# Patient Record
Sex: MALE | Race: WHITE | NOT HISPANIC OR LATINO | Employment: OTHER | URBAN - METROPOLITAN AREA
[De-identification: names, ages, dates, MRNs, and addresses within clinical notes are randomized per-mention and may not be internally consistent; named-entity substitution may affect disease eponyms.]

---

## 2017-01-20 ENCOUNTER — TELEPHONE (OUTPATIENT)
Dept: FAMILY MEDICINE | Facility: CLINIC | Age: 62
End: 2017-01-20

## 2017-01-20 NOTE — TELEPHONE ENCOUNTER
Pt's wife Janel called looking for an eye clinic.  Gave them [facility] and [facility] clinic with both numbers.   Kenia.SHRUTHI Worley ([facility])

## 2017-02-22 ENCOUNTER — OFFICE VISIT (OUTPATIENT)
Dept: FAMILY MEDICINE | Facility: CLINIC | Age: 62
End: 2017-02-22

## 2017-02-22 VITALS
BODY MASS INDEX: 31.4 KG/M2 | OXYGEN SATURATION: 97 % | DIASTOLIC BLOOD PRESSURE: 70 MMHG | WEIGHT: 207.2 LBS | SYSTOLIC BLOOD PRESSURE: 124 MMHG | HEART RATE: 71 BPM | HEIGHT: 68 IN | TEMPERATURE: 98.5 F

## 2017-02-22 DIAGNOSIS — Z11.59 NEED FOR HEPATITIS C SCREENING TEST: ICD-10-CM

## 2017-02-22 DIAGNOSIS — Z00.00 ROUTINE GENERAL MEDICAL EXAMINATION AT A HEALTH CARE FACILITY: Primary | ICD-10-CM

## 2017-02-22 DIAGNOSIS — Z12.5 SCREENING FOR MALIGNANT NEOPLASM OF PROSTATE: ICD-10-CM

## 2017-02-22 PROCEDURE — 80061 LIPID PANEL: CPT | Mod: 90 | Performed by: FAMILY MEDICINE

## 2017-02-22 PROCEDURE — 84153 ASSAY OF PSA TOTAL: CPT | Mod: 90 | Performed by: FAMILY MEDICINE

## 2017-02-22 PROCEDURE — 36415 COLL VENOUS BLD VENIPUNCTURE: CPT | Performed by: FAMILY MEDICINE

## 2017-02-22 PROCEDURE — 99396 PREV VISIT EST AGE 40-64: CPT | Performed by: FAMILY MEDICINE

## 2017-02-22 PROCEDURE — 86803 HEPATITIS C AB TEST: CPT | Mod: 90 | Performed by: FAMILY MEDICINE

## 2017-02-22 PROCEDURE — 80053 COMPREHEN METABOLIC PANEL: CPT | Mod: 90 | Performed by: FAMILY MEDICINE

## 2017-02-22 NOTE — PROGRESS NOTES
SUBJECTIVE:     CC: Katia Soler is an 61 year old male who presents for preventative health visit.     Healthy Habits:    Do you get at least three servings of calcium containing foods daily (dairy, green leafy vegetables, etc.)? yes    Amount of exercise or daily activities, outside of work: 2 day(s) per week    Problems taking medications regularly not applicable    Medication side effects: No    Have you had an eye exam in the past two years? yes    Do you see a dentist twice per year? yes    Do you have sleep apnea, excessive snoring or daytime drowsiness?no            Today's PHQ-2 Score:   PHQ-2 ( 1999 [employer]) 2/22/2017 3/1/2016   Q1: Little interest or pleasure in doing things 0 0   Q2: Feeling down, depressed or hopeless 0 0   PHQ-2 Score 0 0       Abuse: Current or Past(Physical, Sexual or Emotional)- No  Do you feel safe in your environment - Yes    Social History   Substance Use Topics     Smoking status: Never Smoker     Smokeless tobacco: Never Used     Alcohol use 1.8 oz/week     3 Standard drinks or equivalent per week     The patient does not drink >3 drinks per day nor >7 drinks per week.    Last PSA: No results found for: PSA    Recent Labs   Lab Test  03/01/16   1034  03/20/15   0945   CHOL  286*  287*   HDL  48  58   LDL  209*  202*   TRIG  147  133   CHOLHDLRATIO  6.0*  4.9       Reviewed orders with patient. Reviewed health maintenance and updated orders accordingly - Yes    All Histories reviewed and updated in Epic.  Past Medical History   Diagnosis Date     NO ACTIVE PROBLEMS       Past Surgical History   Procedure Laterality Date     No history of surgery         ROS:  C: NEGATIVE for fever, chills, change in weight  I: NEGATIVE for worrisome rashes, moles or lesions  E: NEGATIVE for vision changes or irritation  ENT: NEGATIVE for ear, mouth and throat problems  R: NEGATIVE for significant cough or SOB  CV: NEGATIVE for chest pain, palpitations or peripheral edema  GI: NEGATIVE for  nausea, abdominal pain, heartburn, or change in bowel habits   male: negative for dysuria, hematuria, decreased urinary stream, erectile dysfunction, urethral discharge  M: NEGATIVE for significant arthralgias or myalgia  N: NEGATIVE for weakness, dizziness or paresthesias  P: NEGATIVE for changes in mood or affect    Problem list, Medication list, Allergies, and Medical/Social/Surgical histories reviewed in [facility] and updated as appropriate.  Labs reviewed in EPIC  BP Readings from Last 3 Encounters:   02/22/17 124/70   03/01/16 120/78   03/20/15 128/80    Wt Readings from Last 3 Encounters:   02/22/17 94 kg (207 lb 3.2 oz)   03/01/16 91.6 kg (202 lb)   03/20/15 92.6 kg (204 lb 2 oz)                  Patient Active Problem List   Diagnosis     ACP (advance care planning)     Health Care Home     Pure hypercholesterolemia     Past Surgical History   Procedure Laterality Date     No history of surgery         Social History   Substance Use Topics     Smoking status: Never Smoker     Smokeless tobacco: Never Used     Alcohol use 1.8 oz/week     3 Standard drinks or equivalent per week     Family History   Problem Relation Age of Onset     Hyperlipidemia Mother      DIABETES Father      in his 40s. He is 97 years old now with good memory.     Coronary Artery Disease Father      MI in his 60s     Hyperlipidemia Father      CEREBROVASCULAR DISEASE Father      Hypertension No family hx of      Breast Cancer No family hx of      Cancer - colorectal No family hx of      Ovarian Cancer No family hx of      Prostate Cancer No family hx of      Depression/Anxiety No family hx of      Anesthesia Reaction No family hx of      Thyroid Disease No family hx of      Asthma No family hx of      Chemical Addiction No family hx of          No current outpatient prescriptions on file.     No Known Allergies  Recent Labs   Lab Test  03/01/16   1034  03/20/15   0945   LDL  209*  202*   HDL  48  58   TRIG  147  133   ALT  22  21   CR  1.15  " 1.10   GFRESTIMATED  68  72   POTASSIUM  5.3  4.1      OBJECTIVE:     /70 (BP Location: Right arm, Patient Position: Chair, Cuff Size: Adult Large)  Pulse 71  Temp 98.5  F (36.9  C) (Oral)  Ht 1.727 m (5' 8\")  Wt 94 kg (207 lb 3.2 oz)  SpO2 97%  BMI 31.5 kg/m2  EXAM:  GENERAL: healthy, alert and no distress  EYES: Eyes grossly normal to inspection, PERRL and conjunctivae and sclerae normal  HENT: ear canals and TM's normal, nose and mouth without ulcers or lesions  NECK: no adenopathy, no asymmetry, masses, or scars and thyroid normal to palpation  RESP: lungs clear to auscultation - no rales, rhonchi or wheezes  CV: regular rate and rhythm, normal S1 S2, no S3 or S4, no murmur, click or rub, no peripheral edema and peripheral pulses strong  ABDOMEN: soft, nontender, no hepatosplenomegaly, no masses and bowel sounds normal   (male): deferred  RECTAL (male): deferred  MS: no gross musculoskeletal defects noted, no edema  SKIN: no suspicious lesions or rashes  NEURO: Normal strength and tone, mentation intact and speech normal  PSYCH: mentation appears normal, affect normal/bright    ASSESSMENT/PLAN:     (Z00.00) Routine general medical examination at a health care facility  (primary encounter diagnosis)  Comment: discussed preventitive healthcare   Plan: Lipid Profile ([facility]), COMPREHENSIVE METABOLIC         PANEL ([facility]) XCMP, Hepatits C antibody         ([facility]), VENOUS COLLECTION, HCL PSA, SCREENING         ([facility])        Continue to work on healthy diet and exercise, discussed healthy habits     (Z12.5) Screening for malignant neoplasm of prostate  Comment:   Plan: VENOUS COLLECTION, HCL PSA, SCREENING ([facility])            (Z11.59) Need for hepatitis C screening test  Comment:   Plan: Hepatits C antibody ([facility]), VENOUS COLLECTION              COUNSELING:  Reviewed preventive health counseling, as reflected in patient instructions       Consider AAA screening for ages 65-75 and smoking history       Regular " "exercise       Healthy diet/nutrition       Vision screening       Consider Hep C screening for patients born between 1945 and 1965       Colon cancer screening       Prostate cancer screening         reports that he has never smoked. He has never used smokeless tobacco.    Estimated body mass index is 31.5 kg/(m^2) as calculated from the following:    Height as of this encounter: 1.727 m (5' 8\").    Weight as of this encounter: 94 kg (207 lb 3.2 oz).   Weight management plan: Discussed healthy diet and exercise guidelines and patient will follow up in 12 months in clinic to re-evaluate.    Counseling Resources:  ATP IV Guidelines  Pooled Cohorts Equation Calculator  FRAX Risk Assessment  ICSI Preventive Guidelines  Dietary Guidelines for Americans, 2010  USDA's MyPlate  ASA Prophylaxis  Lung CA Screening    Jordon Mancilla MD  [facility]  "

## 2017-02-22 NOTE — MR AVS SNAPSHOT
"              After Visit Summary   2017    Katia Soler    MRN: [id number]           Patient Information     Date Of Birth          1955        Visit Information        Provider Department      2017 4:30 PM Jordon Mancilla MD [facility]        Today's Diagnoses     Routine general medical examination at a health care facility    -  1    Screening for malignant neoplasm of prostate        Need for hepatitis C screening test           Follow-ups after your visit        Who to contact     If you have questions or need follow up information about today's clinic visit or your schedule please contact [facility] directly at [phone].  Normal or non-critical lab and imaging results will be communicated to you by MyChart, letter or phone within 4 business days after the clinic has received the results. If you do not hear from us within 7 days, please contact the clinic through MyChart or phone. If you have a critical or abnormal lab result, we will notify you by phone as soon as possible.  Submit refill requests through [employer] or call your pharmacy and they will forward the refill request to us. Please allow 3 business days for your refill to be completed.          Additional Information About Your Visit        MyChart Information     Snoox lets you send messages to your doctor, view your test results, renew your prescriptions, schedule appointments and more. To sign up, go to [email or web address] . Click on \"Log in\" on the left side of the screen, which will take you to the Welcome page. Then click on \"Sign up Now\" on the right side of the page.     You will be asked to enter the access code listed below, as well as some personal information. Please follow the directions to create your username and password.     Your access code is: [email or web address]  Expires: 2017  4:46 PM     Your access code will  in 90 days. If you need help or a " "new code, please call your [place] clinic or [phone].        Care EveryWhere ID     This is your Care EveryWhere ID. This could be used by other organizations to access your [place] medical records  [id number]        Your Vitals Were     Pulse Temperature Height Pulse Oximetry BMI (Body Mass Index)       71 98.5  F (36.9  C) (Oral) 1.727 m (5' 8\") 97% 31.5 kg/m2        Blood Pressure from Last 3 Encounters:   02/22/17 124/70   03/01/16 120/78   03/20/15 128/80    Weight from Last 3 Encounters:   02/22/17 94 kg (207 lb 3.2 oz)   03/01/16 91.6 kg (202 lb)   03/20/15 92.6 kg (204 lb 2 oz)              We Performed the Following     COMPREHENSIVE METABOLIC PANEL ([facility]) XCMP     HCL PSA, SCREENING ([facility])     Hepatits C antibody ([facility])     Lipid Profile ([facility])     VENOUS COLLECTION        Primary Care Provider Office Phone # Fax #    Jordon Javad Mancilla [phone]       [facility] [address]  [facility] [address]        Thank you!     Thank you for choosing [facility]  for your care. Our goal is always to provide you with excellent care. Hearing back from our patients is one way we can continue to improve our services. Please take a few minutes to complete the written survey that you may receive in the mail after your visit with us. Thank you!             Your Updated Medication List - Protect others around you: Learn how to safely use, store and throw away your medicines at www.disposemymeds.org.      Notice  As of 2/22/2017  4:46 PM    You have not been prescribed any medications.      "

## 2017-02-23 LAB
ABBOTT PSA - QUEST: 1.7 NG/ML
ALBUMIN SERPL-MCNC: 4.3 G/DL (ref 3.6–5.1)
ALBUMIN/GLOB SERPL: 1.7 (CALC) (ref 1–2.5)
ALP SERPL-CCNC: 58 U/L (ref 40–115)
ALT SERPL-CCNC: 23 U/L (ref 9–46)
AST SERPL-CCNC: 28 U/L (ref 10–35)
BILIRUB SERPL-MCNC: 0.7 MG/DL (ref 0.2–1.2)
BUN SERPL-MCNC: 13 MG/DL (ref 7–25)
BUN/CREATININE RATIO: NORMAL (CALC) (ref 6–22)
CALCIUM SERPL-MCNC: 10 MG/DL (ref 8.6–10.3)
CHLORIDE SERPLBLD-SCNC: 102 MMOL/L (ref 98–110)
CHOLEST SERPL-MCNC: 304 MG/DL (ref 125–200)
CHOLEST/HDLC SERPL: 6.6 (CALC)
CO2 SERPL-SCNC: 23 MMOL/L (ref 20–31)
CREAT SERPL-MCNC: 1.03 MG/DL (ref 0.7–1.25)
EGFR AFRICAN AMERICAN - QUEST: 90 ML/MIN/1.73M2
GFR SERPL CREATININE-BSD FRML MDRD: 77 ML/MIN/1.73M2
GLOBULIN, CALCULATED - QUEST: 2.6 G/DL (CALC) (ref 1.9–3.7)
GLUCOSE - QUEST: 87 MG/DL (ref 65–99)
HCV AB - QUEST: NORMAL
HDLC SERPL-MCNC: 46 MG/DL
LDLC SERPL CALC-MCNC: 221 MG/DL (CALC)
NONHDLC SERPL-MCNC: 258 MG/DL (CALC)
POTASSIUM SERPL-SCNC: 4 MMOL/L (ref 3.5–5.3)
PROT SERPL-MCNC: 6.9 G/DL (ref 6.1–8.1)
SIGNAL TO CUT OFF - QUEST: 0.04
SODIUM SERPL-SCNC: 138 MMOL/L (ref 135–146)
TRIGL SERPL-MCNC: 186 MG/DL

## 2023-04-30 ENCOUNTER — HEALTH MAINTENANCE LETTER (OUTPATIENT)
Age: 68
End: 2023-04-30

## 2023-05-01 SDOH — HEALTH STABILITY: PHYSICAL HEALTH: ON AVERAGE, HOW MANY MINUTES DO YOU ENGAGE IN EXERCISE AT THIS LEVEL?: 40 MIN

## 2023-05-01 SDOH — ECONOMIC STABILITY: TRANSPORTATION INSECURITY
IN THE PAST 12 MONTHS, HAS LACK OF TRANSPORTATION KEPT YOU FROM MEETINGS, WORK, OR FROM GETTING THINGS NEEDED FOR DAILY LIVING?: PATIENT DECLINED

## 2023-05-01 SDOH — HEALTH STABILITY: PHYSICAL HEALTH: ON AVERAGE, HOW MANY DAYS PER WEEK DO YOU ENGAGE IN MODERATE TO STRENUOUS EXERCISE (LIKE A BRISK WALK)?: 4 DAYS

## 2023-05-01 SDOH — ECONOMIC STABILITY: FOOD INSECURITY: WITHIN THE PAST 12 MONTHS, YOU WORRIED THAT YOUR FOOD WOULD RUN OUT BEFORE YOU GOT MONEY TO BUY MORE.: PATIENT DECLINED

## 2023-05-01 SDOH — ECONOMIC STABILITY: TRANSPORTATION INSECURITY
IN THE PAST 12 MONTHS, HAS THE LACK OF TRANSPORTATION KEPT YOU FROM MEDICAL APPOINTMENTS OR FROM GETTING MEDICATIONS?: PATIENT DECLINED

## 2023-05-01 SDOH — ECONOMIC STABILITY: INCOME INSECURITY: IN THE LAST 12 MONTHS, WAS THERE A TIME WHEN YOU WERE NOT ABLE TO PAY THE MORTGAGE OR RENT ON TIME?: PATIENT REFUSED

## 2023-05-01 SDOH — ECONOMIC STABILITY: FOOD INSECURITY: WITHIN THE PAST 12 MONTHS, THE FOOD YOU BOUGHT JUST DIDN'T LAST AND YOU DIDN'T HAVE MONEY TO GET MORE.: PATIENT DECLINED

## 2023-05-01 SDOH — ECONOMIC STABILITY: INCOME INSECURITY: HOW HARD IS IT FOR YOU TO PAY FOR THE VERY BASICS LIKE FOOD, HOUSING, MEDICAL CARE, AND HEATING?: PATIENT DECLINED

## 2023-05-01 ASSESSMENT — LIFESTYLE VARIABLES
SKIP TO QUESTIONS 9-10: 0
AUDIT-C TOTAL SCORE: -1
HOW OFTEN DO YOU HAVE SIX OR MORE DRINKS ON ONE OCCASION: PATIENT DECLINED
HOW OFTEN DO YOU HAVE A DRINK CONTAINING ALCOHOL: PATIENT DECLINED
HOW MANY STANDARD DRINKS CONTAINING ALCOHOL DO YOU HAVE ON A TYPICAL DAY: PATIENT DECLINED

## 2023-05-01 ASSESSMENT — SOCIAL DETERMINANTS OF HEALTH (SDOH)
DO YOU BELONG TO ANY CLUBS OR ORGANIZATIONS SUCH AS CHURCH GROUPS UNIONS, FRATERNAL OR ATHLETIC GROUPS, OR SCHOOL GROUPS?: PATIENT DECLINED
HOW OFTEN DO YOU ATTEND CHURCH OR RELIGIOUS SERVICES?: NEVER
IN A TYPICAL WEEK, HOW MANY TIMES DO YOU TALK ON THE PHONE WITH FAMILY, FRIENDS, OR NEIGHBORS?: ONCE A WEEK
HOW OFTEN DO YOU GET TOGETHER WITH FRIENDS OR RELATIVES?: TWICE A WEEK

## 2023-05-05 ENCOUNTER — OFFICE VISIT (OUTPATIENT)
Dept: FAMILY MEDICINE | Facility: CLINIC | Age: 68
End: 2023-05-05
Payer: COMMERCIAL

## 2023-05-05 VITALS
TEMPERATURE: 97.6 F | OXYGEN SATURATION: 97 % | WEIGHT: 188.2 LBS | RESPIRATION RATE: 16 BRPM | DIASTOLIC BLOOD PRESSURE: 72 MMHG | SYSTOLIC BLOOD PRESSURE: 130 MMHG | HEART RATE: 60 BPM | HEIGHT: 69 IN | BODY MASS INDEX: 27.88 KG/M2

## 2023-05-05 DIAGNOSIS — H26.9 CATARACT OF BOTH EYES, UNSPECIFIED CATARACT TYPE: ICD-10-CM

## 2023-05-05 DIAGNOSIS — Z01.818 PRE-OPERATIVE GENERAL PHYSICAL EXAMINATION: Primary | ICD-10-CM

## 2023-05-05 LAB
ERYTHROCYTE [DISTWIDTH] IN BLOOD BY AUTOMATED COUNT: 13.3 % (ref 10–15)
HCT VFR BLD AUTO: 42.5 % (ref 40–53)
HGB BLD-MCNC: 14.6 G/DL (ref 13.3–17.7)
MCH RBC QN AUTO: 30.5 PG (ref 26.5–33)
MCHC RBC AUTO-ENTMCNC: 34.4 G/DL (ref 31.5–36.5)
MCV RBC AUTO: 89 FL (ref 78–100)
PLATELET # BLD AUTO: 226 10E3/UL (ref 150–450)
RBC # BLD AUTO: 4.78 10E6/UL (ref 4.4–5.9)
WBC # BLD AUTO: 5.3 10E3/UL (ref 4–11)

## 2023-05-05 PROCEDURE — 36415 COLL VENOUS BLD VENIPUNCTURE: CPT | Performed by: FAMILY MEDICINE

## 2023-05-05 PROCEDURE — 85027 COMPLETE CBC AUTOMATED: CPT | Performed by: FAMILY MEDICINE

## 2023-05-05 PROCEDURE — 99204 OFFICE O/P NEW MOD 45 MIN: CPT | Performed by: FAMILY MEDICINE

## 2023-05-05 NOTE — PROGRESS NOTES
[facility]  [address] [facility] [address]  Phone: [phone]  Primary Provider: Jordon Mancilla  Pre-op Performing Provider: DONAVAN WILSON      PREOPERATIVE EVALUATION:  Today's date: 5/5/2023    Katia Soler is a 68 year old male who presents for a preoperative evaluation.       View : No data to display.              Surgical Information:  Surgery/Procedure: cataract surgery left and right eye  Surgery Location: [facility]  Surgeon: [facility] [place]  Surgery Date: 5/9 and 5/25  Time of Surgery: 8:00  Where patient plans to recover: At home with family  Fax number for surgical facility: [phone]    Assessment & Plan     The proposed surgical procedure is considered LOW risk.    Pre-operative general physical examination  Undergoing low risk cataract surgery   Physical activity greater than 4 mets   Low perioperative cardiac risk .  Patient optimized for procedure .  - CBC with platelets    Cataract of both eyes, unspecified cataract type  Optimized for surgery      - No identified additional risk factors other than previously addressed    Antiplatelet or Anticoagulation Medication Instructions:   - Patient is on no antiplatelet or anticoagulation medications.    Additional Medication Instructions:  Patient is on no additional chronic medications    RECOMMENDATION:  APPROVAL GIVEN to proceed with proposed procedure, without further diagnostic evaluation.  56}    Subjective     HPI related to upcoming procedure:         5/1/2023     1:53 PM   Preop Questions   1. Have you ever had a heart attack or stroke? No   2. Have you ever had surgery on your heart or blood vessels, such as a stent placement, a coronary artery bypass, or surgery on an artery in your head, neck, heart, or legs? No   3. Do you have chest pain with activity? No   4. Do you have a history of  heart failure? No   5. Do you currently have a cold, bronchitis or symptoms of other  infection? No   6. Do you have a cough, shortness of breath, or wheezing? No   7. Do you or anyone in your family have previous history of blood clots? No   8. Do you or does anyone in your family have a serious bleeding problem such as prolonged bleeding following surgeries or cuts? No   9. Have you ever had problems with anemia or been told to take iron pills? No   10. Have you had any abnormal blood loss such as black, tarry or bloody stools? No   11. Have you ever had a blood transfusion? No   12. Are you willing to have a blood transfusion if it is medically needed before, during, or after your surgery? Yes   13. Have you or any of your relatives ever had problems with anesthesia? No   14. Do you have sleep apnea, excessive snoring or daytime drowsiness? No   15. Do you have any artifical heart valves or other implanted medical devices like a pacemaker, defibrillator, or continuous glucose monitor? No   16. Do you have artificial joints? No   17. Are you allergic to latex? No       Health Care Directive:  Patient does not have a Health Care Directive or Living Will: Discussed advance care planning with patient; however, patient declined at this time.      Review of Systems  CONSTITUTIONAL: NEGATIVE for fever, chills, change in weight  INTEGUMENTARY/SKIN: NEGATIVE for worrisome rashes, moles or lesions  EYES: NEGATIVE for vision changes or irritation  ENT/MOUTH: NEGATIVE for ear, mouth and throat problems  RESP: NEGATIVE for significant cough or SOB  CV: NEGATIVE for chest pain, palpitations or peripheral edema  GI: NEGATIVE for nausea, abdominal pain, heartburn, or change in bowel habits  : NEGATIVE for frequency, dysuria, or hematuria  MUSCULOSKELETAL: NEGATIVE for significant arthralgias or myalgia  NEURO: NEGATIVE for weakness, dizziness or paresthesias  ENDOCRINE: NEGATIVE for temperature intolerance, skin/hair changes  HEME: NEGATIVE for bleeding problems  PSYCHIATRIC: NEGATIVE for changes in mood or  "affect    Patient Active Problem List    Diagnosis Date Noted     Pure hypercholesterolemia 05/06/2015     Priority: Medium     ACP (advance care planning) 03/20/2015     Priority: Medium       Advance Care Planning 3/1/2016: ACP Review of Chart / Resources Provided:  Reviewed chart for advance care plan.  Katia Soler has no plan or code status on file. Discussed available resources and provided with information. Confirmed code status reflects current choices pending further ACP discussions.  Confirmed/documented legally designated decision makers.  Added by [facility] 03/20/2015     Priority: Medium      Past Medical History:   Diagnosis Date     Arthritis 1995     NO ACTIVE PROBLEMS      Past Surgical History:   Procedure Laterality Date     NO HISTORY OF SURGERY       No current outpatient medications on file.       No Known Allergies     Social History     Tobacco Use     Smoking status: Never     Smokeless tobacco: Never   Vaping Use     Vaping status: Never Used   Substance Use Topics     Alcohol use: Yes     Alcohol/week: 3.0 standard drinks of alcohol     Types: 3 Standard drinks or equivalent per week     History   Drug Use No         Objective     BP (!) 140/78   Pulse 60   Temp 97.6  F (36.4  C) (Tympanic)   Resp 16   Ht 1.746 m (5' 8.75\")   Wt 85.4 kg (188 lb 3.2 oz)   SpO2 97%   BMI 27.99 kg/m      Physical Exam    GENERAL APPEARANCE: healthy, alert and no distress     EYES: EOMI,  PERRL     HENT: ear canals and TM's normal and nose and mouth without ulcers or lesions     NECK: no adenopathy, no asymmetry, masses, or scars and thyroid normal to palpation     RESP: lungs clear to auscultation - no rales, rhonchi or wheezes     CV: regular rates and rhythm, normal S1 S2, no S3 or S4 and no murmur, click or rub     ABDOMEN:  soft, nontender, no HSM or masses and bowel sounds normal     MS: extremities normal- no gross deformities noted, no evidence of inflammation " in joints, FROM in all extremities.     SKIN: no suspicious lesions or rashes     NEURO: Normal strength and tone, sensory exam grossly normal, mentation intact and speech normal     PSYCH: mentation appears normal. and affect normal/bright     LYMPHATICS: No cervical adenopathy    No results for input(s): HGB, PLT, INR, NA, POTASSIUM, CR, A1C in the last 94591 hours.     Diagnostics:  Recent Results (from the past 24 hour(s))   CBC with platelets    Collection Time: 05/05/23  9:19 AM   Result Value Ref Range    WBC Count 5.3 4.0 - 11.0 10e3/uL    RBC Count 4.78 4.40 - 5.90 10e6/uL    Hemoglobin 14.6 13.3 - 17.7 g/dL    Hematocrit 42.5 40.0 - 53.0 %    MCV 89 78 - 100 fL    MCH 30.5 26.5 - 33.0 pg    MCHC 34.4 31.5 - 36.5 g/dL    RDW 13.3 10.0 - 15.0 %    Platelet Count 226 150 - 450 10e3/uL      No EKG required for low risk surgery (cataract, skin procedure, breast biopsy, etc).    Revised Cardiac Risk Index (RCRI):  The patient has the following serious cardiovascular risks for perioperative complications:   - No serious cardiac risks = 0 points     RCRI Interpretation: 0 points: Class I (very low risk - 0.4% complication rate)           Signed Electronically by: Radha Winters MD  Copy of this evaluation report is provided to requesting physician.

## 2023-12-19 ENCOUNTER — TELEPHONE (OUTPATIENT)
Dept: FAMILY MEDICINE | Facility: CLINIC | Age: 68
End: 2023-12-19
Payer: COMMERCIAL

## 2023-12-19 NOTE — TELEPHONE ENCOUNTER
Summary:    Patient is due/failing the following:   COLONOSCOPY    Reviewed:    [] CARE EVERYWHERE  [] LAST OV NOTE   [] FYI TAB  [] MYCHART ACTIVE?  [] LAST PANEL ENCOUNTER  [] FUTURE APPTS  [] IMMUNIZATIONS  [] Media Tab            Action needed:   Patient needs referral/order: colonoscopy    Type of outreach:    Sent [employer] message.                                                                               Neela Cardenas/SHRUTHI  [place]---[facility]

## 2024-07-07 ENCOUNTER — HEALTH MAINTENANCE LETTER (OUTPATIENT)
Age: 69
End: 2024-07-07

## 2025-02-26 ENCOUNTER — TELEPHONE (OUTPATIENT)
Dept: FAMILY MEDICINE | Facility: CLINIC | Age: 70
End: 2025-02-26
Payer: COMMERCIAL

## 2025-02-26 NOTE — TELEPHONE ENCOUNTER
Patient Quality Outreach    Patient is due for the following:   Colon Cancer Screening  Physical Annual Wellness Visit    Action(s) Taken:   Schedule a Annual Wellness Visit    Type of outreach:    Sent letter.    Questions for provider review:    None           Vero Wilson CMA

## 2025-03-11 ENCOUNTER — MYC MEDICAL ADVICE (OUTPATIENT)
Dept: FAMILY MEDICINE | Facility: CLINIC | Age: 70
End: 2025-03-11
Payer: COMMERCIAL

## 2025-03-11 NOTE — TELEPHONE ENCOUNTER
Patient Quality Outreach    Patient is due for the following:   Physical Annual Wellness Visit    Action(s) Taken:   No follow up needed at this time.    Type of outreach:    Sent [employer] message.    Questions for provider review:    None           Hesham Hartman, CMA

## 2025-05-13 ENCOUNTER — TELEPHONE (OUTPATIENT)
Dept: FAMILY MEDICINE | Facility: CLINIC | Age: 70
End: 2025-05-13
Payer: COMMERCIAL

## 2025-05-13 NOTE — TELEPHONE ENCOUNTER
Patient Quality Outreach    Patient is due for the following:   Colon Cancer Screening  Physical Annual Wellness Visit    Action(s) Taken:   No follow up needed at this time.    Type of outreach:    Phone, left message for patient/parent to call back.    Questions for provider review:    None         Hesham Hartman, [facility]  Chart routed to None.

## 2025-07-19 ENCOUNTER — HEALTH MAINTENANCE LETTER (OUTPATIENT)
Age: 70
End: 2025-07-19